# Patient Record
Sex: FEMALE | Race: AMERICAN INDIAN OR ALASKA NATIVE | NOT HISPANIC OR LATINO
[De-identification: names, ages, dates, MRNs, and addresses within clinical notes are randomized per-mention and may not be internally consistent; named-entity substitution may affect disease eponyms.]

---

## 2019-05-26 ENCOUNTER — TRANSCRIPTION ENCOUNTER (OUTPATIENT)
Age: 39
End: 2019-05-26

## 2019-05-26 ENCOUNTER — INPATIENT (INPATIENT)
Facility: HOSPITAL | Age: 39
LOS: 0 days | Discharge: HOME | End: 2019-05-26
Attending: OBSTETRICS & GYNECOLOGY | Admitting: OBSTETRICS & GYNECOLOGY
Payer: MEDICAID

## 2019-05-26 VITALS
HEART RATE: 64 BPM | RESPIRATION RATE: 20 BRPM | DIASTOLIC BLOOD PRESSURE: 89 MMHG | TEMPERATURE: 96 F | OXYGEN SATURATION: 99 % | SYSTOLIC BLOOD PRESSURE: 128 MMHG

## 2019-05-26 VITALS
HEART RATE: 61 BPM | TEMPERATURE: 98 F | DIASTOLIC BLOOD PRESSURE: 56 MMHG | SYSTOLIC BLOOD PRESSURE: 116 MMHG | RESPIRATION RATE: 18 BRPM

## 2019-05-26 DIAGNOSIS — Z98.890 OTHER SPECIFIED POSTPROCEDURAL STATES: Chronic | ICD-10-CM

## 2019-05-26 LAB
ALBUMIN SERPL ELPH-MCNC: 4.8 G/DL — SIGNIFICANT CHANGE UP (ref 3.5–5.2)
ALP SERPL-CCNC: 61 U/L — SIGNIFICANT CHANGE UP (ref 30–115)
ALT FLD-CCNC: 12 U/L — SIGNIFICANT CHANGE UP (ref 0–41)
ANION GAP SERPL CALC-SCNC: 13 MMOL/L — SIGNIFICANT CHANGE UP (ref 7–14)
APTT BLD: 21.3 SEC — CRITICAL LOW (ref 27–39.2)
AST SERPL-CCNC: 15 U/L — SIGNIFICANT CHANGE UP (ref 0–41)
BASOPHILS # BLD AUTO: 0.03 K/UL — SIGNIFICANT CHANGE UP (ref 0–0.2)
BASOPHILS NFR BLD AUTO: 0.4 % — SIGNIFICANT CHANGE UP (ref 0–1)
BILIRUB SERPL-MCNC: 0.3 MG/DL — SIGNIFICANT CHANGE UP (ref 0.2–1.2)
BLD GP AB SCN SERPL QL: SIGNIFICANT CHANGE UP
BUN SERPL-MCNC: 13 MG/DL — SIGNIFICANT CHANGE UP (ref 10–20)
CALCIUM SERPL-MCNC: 9.3 MG/DL — SIGNIFICANT CHANGE UP (ref 8.5–10.1)
CHLORIDE SERPL-SCNC: 105 MMOL/L — SIGNIFICANT CHANGE UP (ref 98–110)
CO2 SERPL-SCNC: 22 MMOL/L — SIGNIFICANT CHANGE UP (ref 17–32)
CREAT SERPL-MCNC: 0.7 MG/DL — SIGNIFICANT CHANGE UP (ref 0.7–1.5)
EOSINOPHIL # BLD AUTO: 0.25 K/UL — SIGNIFICANT CHANGE UP (ref 0–0.7)
EOSINOPHIL NFR BLD AUTO: 3.1 % — SIGNIFICANT CHANGE UP (ref 0–8)
GLUCOSE SERPL-MCNC: 137 MG/DL — HIGH (ref 70–99)
HCG SERPL QL: NEGATIVE — SIGNIFICANT CHANGE UP
HCT VFR BLD CALC: 40.7 % — SIGNIFICANT CHANGE UP (ref 37–47)
HGB BLD-MCNC: 13.9 G/DL — SIGNIFICANT CHANGE UP (ref 12–16)
IMM GRANULOCYTES NFR BLD AUTO: 0.5 % — HIGH (ref 0.1–0.3)
INR BLD: 1.04 RATIO — SIGNIFICANT CHANGE UP (ref 0.65–1.3)
LACTATE SERPL-SCNC: 1.3 MMOL/L — SIGNIFICANT CHANGE UP (ref 0.5–2.2)
LIDOCAIN IGE QN: 31 U/L — SIGNIFICANT CHANGE UP (ref 7–60)
LYMPHOCYTES # BLD AUTO: 2.51 K/UL — SIGNIFICANT CHANGE UP (ref 1.2–3.4)
LYMPHOCYTES # BLD AUTO: 30.7 % — SIGNIFICANT CHANGE UP (ref 20.5–51.1)
MCHC RBC-ENTMCNC: 30.5 PG — SIGNIFICANT CHANGE UP (ref 27–31)
MCHC RBC-ENTMCNC: 34.2 G/DL — SIGNIFICANT CHANGE UP (ref 32–37)
MCV RBC AUTO: 89.3 FL — SIGNIFICANT CHANGE UP (ref 81–99)
MONOCYTES # BLD AUTO: 0.48 K/UL — SIGNIFICANT CHANGE UP (ref 0.1–0.6)
MONOCYTES NFR BLD AUTO: 5.9 % — SIGNIFICANT CHANGE UP (ref 1.7–9.3)
NEUTROPHILS # BLD AUTO: 4.87 K/UL — SIGNIFICANT CHANGE UP (ref 1.4–6.5)
NEUTROPHILS NFR BLD AUTO: 59.4 % — SIGNIFICANT CHANGE UP (ref 42.2–75.2)
NRBC # BLD: 0 /100 WBCS — SIGNIFICANT CHANGE UP (ref 0–0)
PLATELET # BLD AUTO: 293 K/UL — SIGNIFICANT CHANGE UP (ref 130–400)
POTASSIUM SERPL-MCNC: 4 MMOL/L — SIGNIFICANT CHANGE UP (ref 3.5–5)
POTASSIUM SERPL-SCNC: 4 MMOL/L — SIGNIFICANT CHANGE UP (ref 3.5–5)
PROT SERPL-MCNC: 7.8 G/DL — SIGNIFICANT CHANGE UP (ref 6–8)
PROTHROM AB SERPL-ACNC: 12 SEC — SIGNIFICANT CHANGE UP (ref 9.95–12.87)
RBC # BLD: 4.56 M/UL — SIGNIFICANT CHANGE UP (ref 4.2–5.4)
RBC # FLD: 12 % — SIGNIFICANT CHANGE UP (ref 11.5–14.5)
SODIUM SERPL-SCNC: 140 MMOL/L — SIGNIFICANT CHANGE UP (ref 135–146)
TYPE + AB SCN PNL BLD: SIGNIFICANT CHANGE UP
WBC # BLD: 8.18 K/UL — SIGNIFICANT CHANGE UP (ref 4.8–10.8)
WBC # FLD AUTO: 8.18 K/UL — SIGNIFICANT CHANGE UP (ref 4.8–10.8)

## 2019-05-26 PROCEDURE — 74177 CT ABD & PELVIS W/CONTRAST: CPT | Mod: 26

## 2019-05-26 PROCEDURE — 99285 EMERGENCY DEPT VISIT HI MDM: CPT

## 2019-05-26 PROCEDURE — 71046 X-RAY EXAM CHEST 2 VIEWS: CPT | Mod: 26

## 2019-05-26 PROCEDURE — 76830 TRANSVAGINAL US NON-OB: CPT | Mod: 26

## 2019-05-26 RX ORDER — MORPHINE SULFATE 50 MG/1
4 CAPSULE, EXTENDED RELEASE ORAL EVERY 6 HOURS
Refills: 0 | Status: DISCONTINUED | OUTPATIENT
Start: 2019-05-26 | End: 2019-05-26

## 2019-05-26 RX ORDER — SODIUM CHLORIDE 9 MG/ML
1000 INJECTION, SOLUTION INTRAVENOUS
Refills: 0 | Status: DISCONTINUED | OUTPATIENT
Start: 2019-05-26 | End: 2019-05-26

## 2019-05-26 RX ORDER — ONDANSETRON 8 MG/1
4 TABLET, FILM COATED ORAL ONCE
Refills: 0 | Status: COMPLETED | OUTPATIENT
Start: 2019-05-26 | End: 2019-05-26

## 2019-05-26 RX ORDER — PANTOPRAZOLE SODIUM 20 MG/1
40 TABLET, DELAYED RELEASE ORAL EVERY 24 HOURS
Refills: 0 | Status: DISCONTINUED | OUTPATIENT
Start: 2019-05-26 | End: 2019-05-26

## 2019-05-26 RX ORDER — METOCLOPRAMIDE HCL 10 MG
10 TABLET ORAL DAILY
Refills: 0 | Status: DISCONTINUED | OUTPATIENT
Start: 2019-05-26 | End: 2019-05-26

## 2019-05-26 RX ORDER — MORPHINE SULFATE 50 MG/1
4 CAPSULE, EXTENDED RELEASE ORAL ONCE
Refills: 0 | Status: DISCONTINUED | OUTPATIENT
Start: 2019-05-26 | End: 2019-05-26

## 2019-05-26 RX ORDER — SODIUM CHLORIDE 9 MG/ML
1000 INJECTION, SOLUTION INTRAVENOUS ONCE
Refills: 0 | Status: COMPLETED | OUTPATIENT
Start: 2019-05-26 | End: 2019-05-26

## 2019-05-26 RX ADMIN — MORPHINE SULFATE 4 MILLIGRAM(S): 50 CAPSULE, EXTENDED RELEASE ORAL at 10:38

## 2019-05-26 RX ADMIN — SODIUM CHLORIDE 125 MILLILITER(S): 9 INJECTION, SOLUTION INTRAVENOUS at 17:07

## 2019-05-26 RX ADMIN — ONDANSETRON 4 MILLIGRAM(S): 8 TABLET, FILM COATED ORAL at 10:37

## 2019-05-26 RX ADMIN — SODIUM CHLORIDE 2000 MILLILITER(S): 9 INJECTION, SOLUTION INTRAVENOUS at 13:11

## 2019-05-26 NOTE — PROGRESS NOTE ADULT - ATTENDING COMMENTS
Patient seen and examined.  No evidence of pain at this time with benign abdominal exam. Last dose of pain medication at 1030 am.  Low suspicion for torsion but given CT and US findings explained cannot be 100% sure.  At this time patient expressed that she has changed her mind about surgery and desires discharge home.  Precautions discussed, to follow up at outpatient.

## 2019-05-26 NOTE — DISCHARGE NOTE NURSING/CASE MANAGEMENT/SOCIAL WORK - NSDCDPATPORTLINK_GEN_ALL_CORE
You can access the [facility] Patient Portal, offered by [facility], by registering with the following website: [email or web address]

## 2019-05-26 NOTE — H&P ADULT - NSHPLABSRESULTS_GEN_ALL_CORE
5/26 8.18>139/40.7<293, 140/4/105/22/13/0.7<137, AST/ALT 15/12, lipase 31, Bpos      EXAM:  US TRANSVAGINAL            PROCEDURE DATE:  05/26/2019            INTERPRETATION:  CLINICAL HISTORY: Lower abdominal pain.  PROCEDURE: Transabdominal and transvaginal ultrasound of the pelvis was   performed, including Doppler.    LMP: 5/20/2019    FINDINGS:    UTERUS: Measures 10.9 x 6.2 x 5.2 cm with normal echogenicity and   morphology. An IUD is noted.  RIGHT OVARY: measures 3.5 x 3.4 x 2.5 cm, and is unremarkable. 2.3 cm   cyst or dominant follicle. Doppler flow is demonstrated to the right   ovary.   LEFT OVARY: measures 8.0 x 7.9 x 7.9 cm and contains two internal complex   cystic structures, possibly hemorrhagic. Minimal peripheral Doppler flow   is identified.  OTHER: Trace pelvic free fluid.  IMPRESSION:    Enlarged left ovary measuring up to 8 cm with minimal peripheral Doppler   flow. Findings suspicious for ovarian torsion and correlate with recent   CT.            EXAM:  CT ABDOMEN AND PELVIS IC            PROCEDURE DATE:  05/26/2019            INTERPRETATION:  CLINICAL STATEMENT: Lower abdominal pain.      TECHNIQUE: Contiguous axial CT images were obtained from the lower chest   to the pubic symphysis following administration of 100cc Optiray 320   intravenous contrast.  Oral contrast was not administered.  Reformatted   images in the coronal and sagittal planes were acquired.    COMPARISON: None.      FINDINGS:    KIDNEYS: Symmetric enhancement. No hydronephrosis. Left lower pole   punctate nonobstructing calculus.  ABDOMINOPELVIC NODES: Unremarkable.    PELVIC ORGANS: Enlarged midline left ovary measuring up to 8.8 cm is   noted not in anatomic position with multiple areas of internal   hemorrhage. Right ovarian 2.9 cm crenated cyst. Trace pelvic free fluid.   An IUD is noted.   PERITONEUM/MESENTERY/BOWEL: No evidence for bowel obstruction, ascites,   or pneumoperitoneum. Unremarkable appendix.        IMPRESSION:   Enlarged midline left ovary measuring up to 8.8 cm with internal   hemorrhage, suspicious for ovarian torsion.

## 2019-05-26 NOTE — PROGRESS NOTE ADULT - SUBJECTIVE AND OBJECTIVE BOX
STEPHEN PGY2 Note:      ID: [id number]    Pt seen and evaluated at bedside. Denies abdominal pain, nausea/vomiting.     Vital Signs Last 24 Hrs  T(C): 36.6 (26 May 2019 18:10), Max: 36.6 (26 May 2019 18:10)  T(F): 97.9 (26 May 2019 18:10), Max: 97.9 (26 May 2019 18:10)  HR: 61 (26 May 2019 18:10) (61 - 64)  BP: 116/56 (26 May 2019 18:10) (116/56 - 128/89)  RR: 18 (26 May 2019 18:10) (18 - 20)  SpO2: 99% (26 May 2019 09:53) (99% - 99%)     Gen: NAD, AAO X 3  Abd: soft, nontender, no r/g/r   Pelvic: no active bleeding     Pt explained that in view of level I trauma case, surgery would be delayed until later tonight. As she is asymptomatic and last dose of morphine at 1030, pt no longer desires to stay for surgery. Though torsion as a diagnosis is unlikely, pt was explained that only diagnostic laparoscopy would provide 100% certainty. She is aware of the possible sequelae including necrosis of the ovary and possible infection, rupture of hemorrhagic cyst with increased pain.     Plan:   - d/c home   - strict torsion/rupture precautions  - f/u in 1 week with GYN as outpatient with re-imaging at later date   - tylenol/motrin PRN for pain     Dr. Fairbanks and Dr. Lagunas aware.

## 2019-05-26 NOTE — ED PROVIDER NOTE - PHYSICAL EXAMINATION
Constitutional: Well developed, well nourished. NAD. Good general hygiene  Head: Atraumatic.  Eyes: EOMI without discomfort.   ENT: No nasal discharge. Mucous membranes moist.  Neck: Supple. Painless ROM.  Cardiovascular: Regular rhythm. Regular rate. Normal S1 and S2. No murmurs. 2+ pulses in all extremities.   Pulmonary: Normal respiratory rate and effort. Lungs clear to auscultation bilaterally. No wheezing, rales, or rhonchi. Bilateral, equal lung expansion.   Abdominal: Soft. Nondistended. BIlateral lower abdominal tenderness with rebound but no guarding.   Extremities: Pelvis stable. No lower extremity edema. Symmetric calves.  Skin: No rashes.   Neuro: AAOx3. No focal neurological deficits.  Psych: Normal mood. Normal affect.

## 2019-05-26 NOTE — ED PROVIDER NOTE - OBJECTIVE STATEMENT
39y F wo sig PMH presents for lower abd pn since this AM. No f/c/d. Last BM yesterday morning was normal. Felt normal when she went to bed last night. Today, has +nausea and multiple episodes of vomiting. Unable to eat. Thought she had to have a BM, but was not able to pass any stool. No recent hematuria or blood in stools. No hx abd surgeries.  LMP 4 days ago was normal.

## 2019-05-26 NOTE — H&P ADULT - ASSESSMENT
38yo  P3, with LMP 5/30/19, with LLQ pain secondary to 8cm adnexal mass, suspicious for ovarian torsion on imaging, hemodynamically and clinically stable      -On call to the OR  -NPO, IVF  Observation vs surgical management discussed with patient, although pain is well controlled after 1 dose of Morphine and symptoms have improved, the size of mass is high risk for torsion. r/b/a explained. All questions answered. Patient opted for surgical management at this time.    Dr. Lagunas and Dr. Fairbanks aware

## 2019-05-26 NOTE — DISCHARGE NOTE PROVIDER - HOSPITAL COURSE
HPI:    38yo P3, with LMP 19, c/o twisting LLQ pain since 0700, 10/10 intensity, non-radiating, associated with 2 episodes of NBNB vomiting and nausea. S/P morphine/zofran in ED at 1030, currently pain is 1/10 intensity. H/o paragard IUD in place since 6-7 years. Last saw GYN 1 year ago. Denies fever, chills, diarrhea/constipation, dysuria, urgency, frequency, vaginal bleeding/foul smelling vaginal discharge. Denies dizziness/lightheadedness/CP/SOB/palpitations. Denies loss of weight, loss of appetite, early satiety/bloating. Last PO intake at 2100 last night. Last BM yesterday. Last intercourse 6 days ago. Patient does not desire more children.        OBHx: FT  X3, etopX1, D&C     GynHx: h/o conservatively managed ovarian cyst years ago; denies h/o fibroids, abnormal pap smears, STIs    Paragard IUD in place; regular menstrual cycles.          Last pap- - WNL     Last mammogram- 2019- right breast lump (probably benign) with f/u in 6 months     Last colonoscopy- never (26 May 2019 16:56)        Pt explained that in view of level I trauma case, surgery would be delayed until later tonight. As she was asymptomatic and last dose of morphine at 1030, pt no longer desired to stay for surgery. Though torsion as a diagnosis is unlikely, pt was explained that only diagnostic laparoscopy would provide 100% certainty. She is aware of the possible sequelae including necrosis of the ovary and possible infection, rupture of hemorrhagic cyst with increased pain.

## 2019-05-26 NOTE — H&P ADULT - HISTORY OF PRESENT ILLNESS
40yo P3, with LMP 19, c/o twisting LLQ pain since 0700, 10/10 intensity, non-radiating, associated with 2 episodes of NBNB vomiting and nausea. S/P morphine/zofran in ED at 1030, currently pain is 1/10 intensity. H/o paragard IUD in place since 6-7 years. Last saw GYN 1 year ago. Denies fever, chills, diarrhea/constipation, dysuria, urgency, frequency, vaginal bleeding/foul smelling vaginal discharge. Denies dizziness/lightheadedness/CP/SOB/palpitations. Denies loss of weight, loss of appetite, early satiety/bloating. Last PO intake at 2100 last night. Last BM yesterday. Last intercourse 6 days ago. Patient does not desire more children.    OBHx: FT  X3, etopX1, D&C   GynHx: h/o conservatively managed ovarian cyst years ago; denies h/o fibroids, abnormal pap smears, STIs  Paragard IUD in place; regular menstrual cycles.      Last pap- - WNL   Last mammogram- 2019- right breast lump (probably benign) with f/u in 6 months   Last colonoscopy- never

## 2019-05-26 NOTE — DISCHARGE NOTE PROVIDER - NSDCFUADDINST_GEN_ALL_CORE_FT
Please call to make appointment in 1 week with the clinic ([facility]) Please call to make appointment in 1 week with the clinic ([facility]).  If you have severe abdominal pain, heavy vaginal bleeding, fever>100.4F, experience dizziness/lightheadedness/chest pain/shortness of breath/palpitations; please call your doctor or come to emergency department.

## 2019-05-26 NOTE — ED PROVIDER NOTE - NS ED ROS FT
Constitutional: No fever or chills. No unintended weight loss.   Eyes: No vision changes.  ENT: No hearing changes. No ear pain. No sore throat.  Neck: No neck pain or stiffness.  Cardiovascular: No chest pain, palpitations, or edema.  Pulmonary: No cough or SOB. No hemoptysis.  Abdominal: No diarrhea.   : No dysuria or frequency. No hematuria.  Neuro: No headache, syncope, or dizziness.  MS: No back pain. No calf pain/swelling.  Psych: No suicidal or homicidal ideations.

## 2019-05-26 NOTE — DISCHARGE NOTE PROVIDER - CARE PROVIDER_API CALL
Nora Raines)  Obstetrics and Gynecology  [address] [place] [address]  Phone: [phone]  Fax: [phone]  Follow Up Time: 1 week

## 2019-05-26 NOTE — PATIENT PROFILE ADULT - DO YOU FEEL THREATENED BY OTHERS?
1  Please decrease rocaltrol to once a day      2  Please decrease Oscal to twice daily but in between meals    3  Please start renadyl this is a kidney based probiotic, do two tabs one with lunch and dinner for the first week and if you tolerate then increase to three times a day, can take one with each meal  We will get lab work in 4 weeks for this    4  Please obtain repeat blood work this coming Tuesday also to check calcium level and other electrolytes    5   Thank you for coming in to see me today; it was very nice speaking with you no

## 2019-05-26 NOTE — H&P ADULT - NSHPPHYSICALEXAM_GEN_ALL_CORE
general:  not in acute distress  Abdomen: minimal left lower quadrant tenderness, no rebound, guarding or rigidity  external genitalia- normal, no lesions  vagina- normal, no lesions  uterus- 8-10w anteverted, nontender, shifted to right of midline   adnexa- left adnexal tenderness/palpable fullness   cervix- CMT neg, no active bleeding per os, IUD strings visualized and palpable; cervix appears slightly friable

## 2019-05-26 NOTE — ED PROVIDER NOTE - PROGRESS NOTE DETAILS
Attending Note: 40 y/o F with no sig PMHx presents for lower abd pain that started around 7 am. When she woke up pain was minimal and she tried using the bathroom 3 or 4 times and the pain worsened. No past surgeries. No fevers, n/v/d.  LMP April 28th. PE: Well appearing, awake and alert. Cardio – S1S2. Resp - CTAB. Abdomen – soft, td to LLQ and RLQ, L > R. Ext- no calf swelling. Neuro – grossly unremarkable. Plan: Ct abd Possible torsion on CT, OB consulted, trying to arrange for pt to go to [facility]. pt examined by gyn. awaiting dispo. pt explained by gyn risk/benefits of procedure. pt awaiting to speak to

## 2019-05-30 ENCOUNTER — OUTPATIENT (OUTPATIENT)
Dept: OUTPATIENT SERVICES | Facility: HOSPITAL | Age: 39
LOS: 1 days | Discharge: HOME | End: 2019-05-30

## 2019-05-30 ENCOUNTER — APPOINTMENT (OUTPATIENT)
Dept: OBGYN | Facility: CLINIC | Age: 39
End: 2019-05-30
Payer: MEDICAID

## 2019-05-30 VITALS — WEIGHT: 147 LBS | DIASTOLIC BLOOD PRESSURE: 70 MMHG | SYSTOLIC BLOOD PRESSURE: 100 MMHG

## 2019-05-30 DIAGNOSIS — Z98.890 OTHER SPECIFIED POSTPROCEDURAL STATES: Chronic | ICD-10-CM

## 2019-05-30 DIAGNOSIS — Z83.3 FAMILY HISTORY OF DIABETES MELLITUS: ICD-10-CM

## 2019-05-30 DIAGNOSIS — Z00.00 ENCOUNTER FOR GENERAL ADULT MEDICAL EXAMINATION W/OUT ABNORMAL FINDINGS: ICD-10-CM

## 2019-05-30 DIAGNOSIS — Z82.49 FAMILY HISTORY OF ISCHEMIC HEART DISEASE AND OTHER DISEASES OF THE CIRCULATORY SYSTEM: ICD-10-CM

## 2019-05-30 DIAGNOSIS — Z78.9 OTHER SPECIFIED HEALTH STATUS: ICD-10-CM

## 2019-05-30 PROCEDURE — 99213 OFFICE O/P EST LOW 20 MIN: CPT

## 2019-05-30 NOTE — BEGINNING OF VISIT
[] :  [[employer] ] : [employer]   [FreeTextEntry1] : [id number] [FreeTextEntry3] : Mandarin

## 2019-05-30 NOTE — END OF VISIT
[] : Resident [FreeTextEntry3] : 38 y/o with ovarian cyst, asymptomatic at this time, benign exam. Repeat sonogram in 4 weeks and follow up for results.

## 2019-05-31 DIAGNOSIS — R11.2 NAUSEA WITH VOMITING, UNSPECIFIED: ICD-10-CM

## 2019-05-31 DIAGNOSIS — Z53.20 PROCEDURE AND TREATMENT NOT CARRIED OUT BECAUSE OF PATIENT'S DECISION FOR UNSPECIFIED REASONS: ICD-10-CM

## 2019-05-31 DIAGNOSIS — N83.209 UNSPECIFIED OVARIAN CYST, UNSPECIFIED SIDE: ICD-10-CM

## 2019-05-31 DIAGNOSIS — R10.9 UNSPECIFIED ABDOMINAL PAIN: ICD-10-CM

## 2019-05-31 DIAGNOSIS — Z97.5 PRESENCE OF (INTRAUTERINE) CONTRACEPTIVE DEVICE: ICD-10-CM

## 2019-05-31 DIAGNOSIS — N83.202 UNSPECIFIED OVARIAN CYST, LEFT SIDE: ICD-10-CM

## 2019-06-14 ENCOUNTER — APPOINTMENT (OUTPATIENT)
Dept: ANTEPARTUM | Facility: CLINIC | Age: 39
End: 2019-06-14
Payer: MEDICAID

## 2019-06-14 PROCEDURE — 76830 TRANSVAGINAL US NON-OB: CPT | Mod: 26

## 2019-06-14 PROCEDURE — 99213 OFFICE O/P EST LOW 20 MIN: CPT | Mod: 25

## 2019-06-14 PROCEDURE — 76857 US EXAM PELVIC LIMITED: CPT | Mod: 26

## 2019-06-14 NOTE — DISCUSSION/SUMMARY
[FreeTextEntry1] : Chart reviewed and patient interviewed. Had ultrasound exam that showed a complex 8 x 9 x 5 cm. left ovarian mass. Informed patient of the need for surgery and rescheduled her follow up appointment.

## 2019-06-20 ENCOUNTER — OUTPATIENT (OUTPATIENT)
Dept: OUTPATIENT SERVICES | Facility: HOSPITAL | Age: 39
LOS: 1 days | Discharge: HOME | End: 2019-06-20

## 2019-06-20 ENCOUNTER — APPOINTMENT (OUTPATIENT)
Dept: OBGYN | Facility: CLINIC | Age: 39
End: 2019-06-20
Payer: MEDICAID

## 2019-06-20 VITALS
HEIGHT: 61 IN | BODY MASS INDEX: 27 KG/M2 | DIASTOLIC BLOOD PRESSURE: 60 MMHG | SYSTOLIC BLOOD PRESSURE: 100 MMHG | WEIGHT: 143 LBS

## 2019-06-20 DIAGNOSIS — Z98.890 OTHER SPECIFIED POSTPROCEDURAL STATES: Chronic | ICD-10-CM

## 2019-06-20 DIAGNOSIS — N83.202 UNSPECIFIED OVARIAN CYST, LEFT SIDE: ICD-10-CM

## 2019-06-20 PROCEDURE — 99213 OFFICE O/P EST LOW 20 MIN: CPT

## 2019-06-20 NOTE — END OF VISIT
[] : Resident [FreeTextEntry3] : 38 y/o with complex left ovarian mass, for surgery. Scheduled for laparoscopic ovarian cystectomy on 6/28/19 at 0900. Sent for tumor markers. torsion precautions given. RTC post op.

## 2019-06-20 NOTE — COUNSELING
[Fertility Options] : fertility options [Lab Results] : lab results [Pregnancy Options] : pregnancy options

## 2019-06-24 ENCOUNTER — OUTPATIENT (OUTPATIENT)
Dept: OUTPATIENT SERVICES | Facility: HOSPITAL | Age: 39
LOS: 1 days | Discharge: HOME | End: 2019-06-24
Payer: MEDICAID

## 2019-06-24 VITALS
HEART RATE: 66 BPM | TEMPERATURE: 98 F | HEIGHT: 61 IN | SYSTOLIC BLOOD PRESSURE: 114 MMHG | DIASTOLIC BLOOD PRESSURE: 74 MMHG | RESPIRATION RATE: 16 BRPM | OXYGEN SATURATION: 100 %

## 2019-06-24 DIAGNOSIS — N83.209 UNSPECIFIED OVARIAN CYST, UNSPECIFIED SIDE: ICD-10-CM

## 2019-06-24 DIAGNOSIS — Z98.890 OTHER SPECIFIED POSTPROCEDURAL STATES: Chronic | ICD-10-CM

## 2019-06-24 DIAGNOSIS — Z01.818 ENCOUNTER FOR OTHER PREPROCEDURAL EXAMINATION: ICD-10-CM

## 2019-06-24 LAB
APPEARANCE UR: CLEAR — SIGNIFICANT CHANGE UP
BILIRUB UR-MCNC: NEGATIVE — SIGNIFICANT CHANGE UP
COLOR SPEC: YELLOW — SIGNIFICANT CHANGE UP
DIFF PNL FLD: NEGATIVE — SIGNIFICANT CHANGE UP
EPI CELLS # UR: ABNORMAL /HPF
GLUCOSE UR QL: NEGATIVE MG/DL — SIGNIFICANT CHANGE UP
KETONES UR-MCNC: NEGATIVE — SIGNIFICANT CHANGE UP
LEUKOCYTE ESTERASE UR-ACNC: ABNORMAL
NITRITE UR-MCNC: NEGATIVE — SIGNIFICANT CHANGE UP
PH UR: 6 — SIGNIFICANT CHANGE UP (ref 5–8)
PROT UR-MCNC: NEGATIVE MG/DL — SIGNIFICANT CHANGE UP
SP GR SPEC: 1.02 — SIGNIFICANT CHANGE UP (ref 1.01–1.03)
UROBILINOGEN FLD QL: 0.2 MG/DL — SIGNIFICANT CHANGE UP (ref 0.2–0.2)
WBC UR QL: ABNORMAL /HPF

## 2019-06-24 PROCEDURE — 93010 ELECTROCARDIOGRAM REPORT: CPT

## 2019-06-24 NOTE — H&P PST ADULT - NSANTHOSAYNRD_GEN_A_CORE
No. LAI screening performed.  STOP BANG Legend: 0-2 = LOW Risk; 3-4 = INTERMEDIATE Risk; 5-8 = HIGH Risk

## 2019-06-24 NOTE — H&P PST ADULT - NSICDXPASTMEDICALHX_GEN_ALL_CORE_FT
PAST MEDICAL HISTORY:  Hepatitis ??CARRIER  UNSURE OF WHAT TYPE    Ovarian cyst     Seasonal allergies

## 2019-06-24 NOTE — H&P PST ADULT - HISTORY OF PRESENT ILLNESS
PT SPEAKS SOME ENGLISH  ABLE TO SPELL NAME IN ENGLISH AND STATE  IN ENGLISH    FOR OTHER PARTS OF HISTORY    "FIBROID AND POSSIBLE REMOVAL OF AN OVARY"  PT CURRENTLY DENIES CHEST PAIN, PALPITATIONS, DYSURIA, RECENT ILLNESS  EXERCISE TOLERANCE CAN WALK 1/2 HOUR  PT DENIES ANY RASHES, ABRASION, OR OPEN WOUNDS OR LACERATIONS  AS PER THE PT, THIS IS A COMPLETE MEDICAL AND SURGICAL HX, INCLUDING MEDICATIONS PRESCRIBED AND OVER THE COUNTER PT SPEAKS SOME ENGLISH  ABLE TO SPELL NAME IN ENGLISH AND STATE  IN ENGLISH    FOR OTHER PARTS OF HISTORY    [facility]  [id number]    "FIBROID AND POSSIBLE REMOVAL OF AN OVARY"  PT CURRENTLY DENIES CHEST PAIN, PALPITATIONS, DYSURIA, RECENT ILLNESS  EXERCISE TOLERANCE CAN WALK 1/2 HOUR  PT DENIES ANY RASHES, ABRASION, OR OPEN WOUNDS OR LACERATIONS  AS PER THE PT, THIS IS A COMPLETE MEDICAL AND SURGICAL HX, INCLUDING MEDICATIONS PRESCRIBED AND OVER THE COUNTER

## 2019-06-25 LAB
CANCER AG125 SERPL-ACNC: 37 U/ML
CANCER AG19-9 SERPL-ACNC: 25 U/ML
CEA SERPL-MCNC: 1.2 NG/ML
INHIBIN A SERPL-MCNC: 84 PG/ML
INHIBIN B: < 10 PG/ML

## 2019-06-26 DIAGNOSIS — N83.202 UNSPECIFIED OVARIAN CYST, LEFT SIDE: ICD-10-CM

## 2019-06-28 ENCOUNTER — RESULT REVIEW (OUTPATIENT)
Age: 39
End: 2019-06-28

## 2019-06-28 ENCOUNTER — OUTPATIENT (OUTPATIENT)
Dept: OUTPATIENT SERVICES | Facility: HOSPITAL | Age: 39
LOS: 1 days | Discharge: HOME | End: 2019-06-28
Payer: MEDICAID

## 2019-06-28 VITALS
HEART RATE: 67 BPM | SYSTOLIC BLOOD PRESSURE: 118 MMHG | TEMPERATURE: 98 F | OXYGEN SATURATION: 99 % | RESPIRATION RATE: 13 BRPM | DIASTOLIC BLOOD PRESSURE: 75 MMHG

## 2019-06-28 VITALS
HEIGHT: 61 IN | HEART RATE: 68 BPM | TEMPERATURE: 98 F | DIASTOLIC BLOOD PRESSURE: 66 MMHG | SYSTOLIC BLOOD PRESSURE: 122 MMHG | WEIGHT: 145.06 LBS | RESPIRATION RATE: 16 BRPM | OXYGEN SATURATION: 98 %

## 2019-06-28 DIAGNOSIS — Z98.890 OTHER SPECIFIED POSTPROCEDURAL STATES: Chronic | ICD-10-CM

## 2019-06-28 PROCEDURE — 88112 CYTOPATH CELL ENHANCE TECH: CPT | Mod: 26

## 2019-06-28 PROCEDURE — 88305 TISSUE EXAM BY PATHOLOGIST: CPT | Mod: 26

## 2019-06-28 PROCEDURE — 58661 LAPAROSCOPY REMOVE ADNEXA: CPT

## 2019-06-28 RX ORDER — OXYCODONE HYDROCHLORIDE 5 MG/1
5 TABLET ORAL ONCE
Refills: 0 | Status: DISCONTINUED | OUTPATIENT
Start: 2019-06-28 | End: 2019-06-28

## 2019-06-28 RX ORDER — SODIUM CHLORIDE 9 MG/ML
1000 INJECTION, SOLUTION INTRAVENOUS
Refills: 0 | Status: DISCONTINUED | OUTPATIENT
Start: 2019-06-28 | End: 2019-07-13

## 2019-06-28 RX ORDER — HYDROMORPHONE HYDROCHLORIDE 2 MG/ML
0.5 INJECTION INTRAMUSCULAR; INTRAVENOUS; SUBCUTANEOUS
Refills: 0 | Status: DISCONTINUED | OUTPATIENT
Start: 2019-06-28 | End: 2019-06-28

## 2019-06-28 RX ORDER — ONDANSETRON 8 MG/1
4 TABLET, FILM COATED ORAL ONCE
Refills: 0 | Status: DISCONTINUED | OUTPATIENT
Start: 2019-06-28 | End: 2019-07-13

## 2019-06-28 RX ADMIN — OXYCODONE HYDROCHLORIDE 5 MILLIGRAM(S): 5 TABLET ORAL at 14:49

## 2019-06-28 RX ADMIN — SODIUM CHLORIDE 100 MILLILITER(S): 9 INJECTION, SOLUTION INTRAVENOUS at 12:52

## 2019-06-28 RX ADMIN — OXYCODONE HYDROCHLORIDE 5 MILLIGRAM(S): 5 TABLET ORAL at 14:48

## 2019-06-28 NOTE — BRIEF OPERATIVE NOTE - OPERATION/FINDINGS
Normal external genitalia. Normal appearing uterus, right fallopian tube, right ovary, and left fallopian tube. Left ovary approximately 12cm in size, no discoloration, mild edema. No ovarian torsion. No gross abnormalities on abdominal survey.

## 2019-06-28 NOTE — CHART NOTE - NSCHARTNOTEFT_GEN_A_CORE
PACU ANESTHESIA ADMISSION NOTE      Procedure: Left salpingoophorectomy: laparoscopic    Post op diagnosis:  Left ovarian cyst      ____  Intubated  TV:______       Rate: ______      FiO2: ______    __x__  Patent Airway    ____  Full return of protective reflexes    _x___  Full recovery from anesthesia / back to baseline status    Vitals:  T(F): 97.6  HR: 70  BP: 114/60  RR: 18  SpO2: 100%    Mental Status:  ___x_ Awake   ___x__ Alert   _____ Drowsy   _____ Sedated    Nausea/Vomiting:  __x__ NO  ______Yes,   See Post - Op Orders          Pain Scale (0-10):  _____    Treatment: ____ None    __x__ See Post - Op/PCA Orders    Post - Operative Fluids:   ____ Oral   ___x_ See Post - Op Orders    Plan: Discharge:   __x__Home       _____Floor     _____Critical Care    _____  Other:_________________    Comments: Patient tolerated procedure  No anesthesia complications  Transfer to PACU

## 2019-06-28 NOTE — ASU DISCHARGE PLAN (ADULT/PEDIATRIC) - CARE PROVIDER_API CALL
Isa Maldonado)  OBSGYN  Physicians  [address] [place] [address]  Phone: [phone]  Fax: [phone]  Follow Up Time:

## 2019-06-28 NOTE — ASU DISCHARGE PLAN (ADULT/PEDIATRIC) - MEDICATION INSTRUCTIONS
You may take Motrin/Ibuprofen (two names for the same medicine) 600mg every 6 hours as needed for pain OR percocet 1 tablet every 6 hours as needed for pain.

## 2019-07-01 PROBLEM — J30.2 OTHER SEASONAL ALLERGIC RHINITIS: Chronic | Status: ACTIVE | Noted: 2019-06-24

## 2019-07-01 PROBLEM — K75.9 INFLAMMATORY LIVER DISEASE, UNSPECIFIED: Chronic | Status: ACTIVE | Noted: 2019-06-24

## 2019-07-01 PROBLEM — N83.209 UNSPECIFIED OVARIAN CYST, UNSPECIFIED SIDE: Chronic | Status: ACTIVE | Noted: 2019-06-24

## 2019-07-01 LAB
NON-GYNECOLOGICAL CYTOLOGY STUDY: SIGNIFICANT CHANGE UP
NON-GYNECOLOGICAL CYTOLOGY STUDY: SIGNIFICANT CHANGE UP
SURGICAL PATHOLOGY STUDY: SIGNIFICANT CHANGE UP

## 2019-07-09 DIAGNOSIS — N83.12 CORPUS LUTEUM CYST OF LEFT OVARY: ICD-10-CM

## 2019-07-11 ENCOUNTER — OUTPATIENT (OUTPATIENT)
Dept: OUTPATIENT SERVICES | Facility: HOSPITAL | Age: 39
LOS: 1 days | Discharge: HOME | End: 2019-07-11

## 2019-07-11 ENCOUNTER — APPOINTMENT (OUTPATIENT)
Dept: OBGYN | Facility: CLINIC | Age: 39
End: 2019-07-11
Payer: MEDICAID

## 2019-07-11 VITALS
HEIGHT: 61 IN | SYSTOLIC BLOOD PRESSURE: 104 MMHG | WEIGHT: 144 LBS | DIASTOLIC BLOOD PRESSURE: 80 MMHG | BODY MASS INDEX: 27.19 KG/M2

## 2019-07-11 DIAGNOSIS — Z98.890 OTHER SPECIFIED POSTPROCEDURAL STATES: Chronic | ICD-10-CM

## 2019-07-11 PROCEDURE — 99213 OFFICE O/P EST LOW 20 MIN: CPT

## 2019-07-11 NOTE — REVIEW OF SYSTEMS
[Fever] : no fever [Chills] : no chills [Abdominal Pain] : no abdominal pain [SOB on Exertion] : no shortness of breath during exertion [Dysuria] : no dysuria [Pelvic Pain] : no pelvic pain

## 2019-07-11 NOTE — END OF VISIT
[] : Resident [FreeTextEntry3] : Patient here for post op visit, uncomplicated. Pathology reviewed with patient. Follow up 1 year for annual or PRN.

## 2019-07-11 NOTE — HISTORY OF PRESENT ILLNESS
[Good] : being in good health [Nausea] : no nausea [Fever] : no fever [Diarrhea] : no diarrhea [Vomiting] : no vomiting [Vaginal Bleeding] : no vaginal bleeding [Pelvic Pressure] : no pelvic pressure [Dysuria] : no dysuria [FreeTextEntry8] : N

## 2019-07-16 DIAGNOSIS — Z48.816 ENCOUNTER FOR SURGICAL AFTERCARE FOLLOWING SURGERY ON THE GENITOURINARY SYSTEM: ICD-10-CM

## 2020-07-16 ENCOUNTER — APPOINTMENT (OUTPATIENT)
Dept: OBGYN | Facility: CLINIC | Age: 40
End: 2020-07-16

## 2020-11-03 ENCOUNTER — APPOINTMENT (OUTPATIENT)
Dept: OBGYN | Facility: CLINIC | Age: 40
End: 2020-11-03
Payer: MEDICAID

## 2020-11-03 ENCOUNTER — OUTPATIENT (OUTPATIENT)
Dept: OUTPATIENT SERVICES | Facility: HOSPITAL | Age: 40
LOS: 1 days | Discharge: HOME | End: 2020-11-03

## 2020-11-03 VITALS
BODY MASS INDEX: 28.7 KG/M2 | WEIGHT: 152 LBS | DIASTOLIC BLOOD PRESSURE: 80 MMHG | HEIGHT: 61 IN | SYSTOLIC BLOOD PRESSURE: 126 MMHG

## 2020-11-03 DIAGNOSIS — Z98.890 OTHER SPECIFIED POSTPROCEDURAL STATES: Chronic | ICD-10-CM

## 2020-11-03 DIAGNOSIS — Z01.419 ENCOUNTER FOR GYNECOLOGICAL EXAMINATION (GENERAL) (ROUTINE) W/OUT ABNORMAL FINDINGS: ICD-10-CM

## 2020-11-03 PROCEDURE — 99396 PREV VISIT EST AGE 40-64: CPT

## 2020-11-07 LAB — HPV HIGH+LOW RISK DNA PNL CVX: NOT DETECTED

## 2020-12-04 ENCOUNTER — NON-APPOINTMENT (OUTPATIENT)
Age: 40
End: 2020-12-04

## 2023-11-22 NOTE — PATIENT PROFILE ADULT - BRADEN SENSORY
Medication: clopidogrel (PLAVIX) 75 MG tablet  Last office visit date: 10/30/23  Next appointment scheduled?: Yes   Number of refills given: 3   (4) no impairment
